# Patient Record
Sex: FEMALE | Race: BLACK OR AFRICAN AMERICAN | NOT HISPANIC OR LATINO | Employment: UNEMPLOYED | URBAN - METROPOLITAN AREA
[De-identification: names, ages, dates, MRNs, and addresses within clinical notes are randomized per-mention and may not be internally consistent; named-entity substitution may affect disease eponyms.]

---

## 2019-09-25 ENCOUNTER — HOSPITAL ENCOUNTER (EMERGENCY)
Facility: OTHER | Age: 29
Discharge: HOME | End: 2019-09-26
Attending: EMERGENCY MEDICINE
Payer: MEDICAID

## 2019-09-25 DIAGNOSIS — T81.89XA DRAINING POSTOPERATIVE WOUND, INITIAL ENCOUNTER: Primary | ICD-10-CM

## 2019-09-25 LAB
ALBUMIN SERPL BCP-MCNC: 3.6 G/DL (ref 3.5–5.2)
ALP SERPL-CCNC: 96 U/L (ref 55–135)
ALT SERPL W/O P-5'-P-CCNC: 49 U/L (ref 10–44)
ANION GAP SERPL CALC-SCNC: 13 MMOL/L (ref 8–16)
AST SERPL-CCNC: 44 U/L (ref 10–40)
B-HCG UR QL: NEGATIVE
BASOPHILS # BLD AUTO: 0.06 K/UL (ref 0–0.2)
BASOPHILS NFR BLD: 0.4 % (ref 0–1.9)
BILIRUB SERPL-MCNC: 0.3 MG/DL (ref 0.1–1)
BUN SERPL-MCNC: 8 MG/DL (ref 6–20)
CALCIUM SERPL-MCNC: 10.4 MG/DL (ref 8.7–10.5)
CHLORIDE SERPL-SCNC: 101 MMOL/L (ref 95–110)
CO2 SERPL-SCNC: 25 MMOL/L (ref 23–29)
CREAT SERPL-MCNC: 0.8 MG/DL (ref 0.5–1.4)
CTP QC/QA: YES
DIFFERENTIAL METHOD: ABNORMAL
EOSINOPHIL # BLD AUTO: 0.1 K/UL (ref 0–0.5)
EOSINOPHIL NFR BLD: 0.7 % (ref 0–8)
ERYTHROCYTE [DISTWIDTH] IN BLOOD BY AUTOMATED COUNT: 13.9 % (ref 11.5–14.5)
EST. GFR  (AFRICAN AMERICAN): >60 ML/MIN/1.73 M^2
EST. GFR  (NON AFRICAN AMERICAN): >60 ML/MIN/1.73 M^2
GLUCOSE SERPL-MCNC: 93 MG/DL (ref 70–110)
HCT VFR BLD AUTO: 31.5 % (ref 37–48.5)
HGB BLD-MCNC: 10.3 G/DL (ref 12–16)
IMM GRANULOCYTES # BLD AUTO: 0.09 K/UL (ref 0–0.04)
IMM GRANULOCYTES NFR BLD AUTO: 0.6 % (ref 0–0.5)
LYMPHOCYTES # BLD AUTO: 5.1 K/UL (ref 1–4.8)
LYMPHOCYTES NFR BLD: 32.2 % (ref 18–48)
MCH RBC QN AUTO: 22.5 PG (ref 27–31)
MCHC RBC AUTO-ENTMCNC: 32.7 G/DL (ref 32–36)
MCV RBC AUTO: 69 FL (ref 82–98)
MONOCYTES # BLD AUTO: 1.2 K/UL (ref 0.3–1)
MONOCYTES NFR BLD: 7.4 % (ref 4–15)
NEUTROPHILS # BLD AUTO: 9.2 K/UL (ref 1.8–7.7)
NEUTROPHILS NFR BLD: 58.7 % (ref 38–73)
NRBC BLD-RTO: 0 /100 WBC
PLATELET # BLD AUTO: 466 K/UL (ref 150–350)
PMV BLD AUTO: 9.1 FL (ref 9.2–12.9)
POTASSIUM SERPL-SCNC: 3.7 MMOL/L (ref 3.5–5.1)
PROT SERPL-MCNC: 9 G/DL (ref 6–8.4)
RBC # BLD AUTO: 4.58 M/UL (ref 4–5.4)
SODIUM SERPL-SCNC: 139 MMOL/L (ref 136–145)
WBC # BLD AUTO: 15.67 K/UL (ref 3.9–12.7)

## 2019-09-25 PROCEDURE — S0077 INJECTION, CLINDAMYCIN PHOSP: HCPCS | Performed by: EMERGENCY MEDICINE

## 2019-09-25 PROCEDURE — 87040 BLOOD CULTURE FOR BACTERIA: CPT | Mod: 59

## 2019-09-25 PROCEDURE — 96361 HYDRATE IV INFUSION ADD-ON: CPT

## 2019-09-25 PROCEDURE — 85025 COMPLETE CBC W/AUTO DIFF WBC: CPT

## 2019-09-25 PROCEDURE — 99284 EMERGENCY DEPT VISIT MOD MDM: CPT | Mod: 25

## 2019-09-25 PROCEDURE — 81025 URINE PREGNANCY TEST: CPT | Performed by: EMERGENCY MEDICINE

## 2019-09-25 PROCEDURE — 96365 THER/PROPH/DIAG IV INF INIT: CPT

## 2019-09-25 PROCEDURE — 96375 TX/PRO/DX INJ NEW DRUG ADDON: CPT

## 2019-09-25 PROCEDURE — 63600175 PHARM REV CODE 636 W HCPCS: Performed by: EMERGENCY MEDICINE

## 2019-09-25 PROCEDURE — 80053 COMPREHEN METABOLIC PANEL: CPT

## 2019-09-25 PROCEDURE — 25000003 PHARM REV CODE 250: Performed by: EMERGENCY MEDICINE

## 2019-09-25 RX ORDER — KETOROLAC TROMETHAMINE 30 MG/ML
15 INJECTION, SOLUTION INTRAMUSCULAR; INTRAVENOUS
Status: COMPLETED | OUTPATIENT
Start: 2019-09-25 | End: 2019-09-25

## 2019-09-25 RX ORDER — CLINDAMYCIN PHOSPHATE 600 MG/50ML
600 INJECTION, SOLUTION INTRAVENOUS
Status: COMPLETED | OUTPATIENT
Start: 2019-09-25 | End: 2019-09-25

## 2019-09-25 RX ADMIN — KETOROLAC TROMETHAMINE 15 MG: 30 INJECTION, SOLUTION INTRAMUSCULAR; INTRAVENOUS at 09:09

## 2019-09-25 RX ADMIN — CLINDAMYCIN IN 5 PERCENT DEXTROSE 600 MG: 12 INJECTION, SOLUTION INTRAVENOUS at 10:09

## 2019-09-25 RX ADMIN — VANCOMYCIN HYDROCHLORIDE 2000 MG: 100 INJECTION, POWDER, LYOPHILIZED, FOR SOLUTION INTRAVENOUS at 10:09

## 2019-09-25 RX ADMIN — SODIUM CHLORIDE 1000 ML: 0.9 INJECTION, SOLUTION INTRAVENOUS at 09:09

## 2019-09-26 VITALS
HEIGHT: 63 IN | SYSTOLIC BLOOD PRESSURE: 123 MMHG | DIASTOLIC BLOOD PRESSURE: 70 MMHG | BODY MASS INDEX: 37.74 KG/M2 | RESPIRATION RATE: 18 BRPM | WEIGHT: 213 LBS | OXYGEN SATURATION: 100 % | TEMPERATURE: 99 F | HEART RATE: 93 BPM

## 2019-09-26 PROCEDURE — 25000003 PHARM REV CODE 250: Performed by: EMERGENCY MEDICINE

## 2019-09-26 RX ORDER — LIDOCAINE HYDROCHLORIDE AND EPINEPHRINE 10; 10 MG/ML; UG/ML
10 INJECTION, SOLUTION INFILTRATION; PERINEURAL
Status: COMPLETED | OUTPATIENT
Start: 2019-09-26 | End: 2019-09-26

## 2019-09-26 RX ORDER — SULFAMETHOXAZOLE AND TRIMETHOPRIM 800; 160 MG/1; MG/1
1 TABLET ORAL 2 TIMES DAILY
Qty: 14 TABLET | Refills: 0 | Status: SHIPPED | OUTPATIENT
Start: 2019-09-26 | End: 2019-10-03

## 2019-09-26 RX ORDER — NAPROXEN 500 MG/1
500 TABLET ORAL 2 TIMES DAILY PRN
Qty: 60 TABLET | Refills: 0 | Status: SHIPPED | OUTPATIENT
Start: 2019-09-26

## 2019-09-26 RX ADMIN — LIDOCAINE HYDROCHLORIDE,EPINEPHRINE BITARTRATE 10 ML: 10; .01 INJECTION, SOLUTION INFILTRATION; PERINEURAL at 12:09

## 2019-09-26 NOTE — DISCHARGE INSTRUCTIONS
Call your primary care doctor to make the first available appointment.     Keep all your medical appointments.     Take your regular medication as prescribed. Contact your primary care provider before running out of medication, or for any problems obtaining them.    Do not drive or operate heavy machinery while taking opioid or muscle relaxing medications. Examples include norco, percocet, xanax, valium, flexeril.     Overuse or long term use of pain and sedating medication may lead to addiction, dependence, organ failure, family and work problems, legal problems, accidental overdose and death.    If you do not have health insurance, you probably qualify for heavily discounted rates:  Call [phone] (Formerly [facility] hotline) or go to www.[employer].[place].gov    Your evaluation in the ED does not suggest any emergent or life threatening medical condition requiring admission or immediate intervention beyond that provided in the ED.   However, the signs of a serious problem sometimes take more time to appear.   RETURN TO THE ER if any of the following occur:    Weakness, dizziness, fainting, or loss of consciousness   Fever of 100.4ºF (38ºC) or higher  Any worse symptoms  Any new or concerning symptoms

## 2019-09-26 NOTE — CONSULTS
[facility]  Plastic Surgery  Consult Note    Patient Name: Soumya Kraft  MRN: [id number]  Code Status: Prior  Admission Date: 2019  Attending Physician: Derik Ring MD  Primary Care Provider: Primary Doctor No    Consults  Subjective:     Chief Complaint/Reason for Admission: Left breast drainage    History of Present Illness:     29F s/p BBR at [facility] with [place] on presents for redness and drainage from her left breast for the past day. For the past several days she has had a GI illness that resolved, asymptomatic today. She has not had a fever since Saturday. Mild tenderness of left breast. Up until this poin her recovery has been uneventful.    Current Facility-Administered Medications on File Prior to Encounter   Medication    etonogestrel Impl 68 mg     Current Outpatient Medications on File Prior to Encounter   Medication Sig    ibuprofen (ADVIL,MOTRIN) 800 MG tablet Take 1 tablet (800 mg total) by mouth 3 (three) times daily.    oxycodone-acetaminophen (PERCOCET) 5-325 mg per tablet Take 1 tablet by mouth every 4 (four) hours as needed.    prenatal vit-iron fumarate-FA 27-1 mg Tab Take 1 tablet by mouth once daily.    valacyclovir (VALTREX) 500 MG tablet Take 500 mg by mouth once daily.       Review of patient's allergies indicates:  No Known Allergies    Past Medical History:   Diagnosis Date    Herpes simplex virus (HSV) infection      labor      Past Surgical History:   Procedure Laterality Date    CERVICAL BIOPSY  W/ LOOP ELECTRODE EXCISION  2016    OLIVIA III     Family History     Problem Relation (Age of Onset)    Hypertension Maternal Aunt    Stroke Maternal Aunt        Tobacco Use    Smoking status: Never Smoker    Smokeless tobacco: Never Used   Substance and Sexual Activity    Alcohol use: No    Drug use: No    Sexual activity: Yes     Partners: Male     Review of Systems  Objective:     Vital Signs (Most Recent):  Temp: 98.5 °F (36.9 °C) (19  1953)  Pulse: 87 (09/25/19 2352)  Resp: 18 (09/25/19 1953)  BP: 137/73 (09/25/19 2346)  SpO2: 100 % (09/25/19 2352) Vital Signs (24h Range):  Temp:  [98.5 °F (36.9 °C)] 98.5 °F (36.9 °C)  Pulse:  [85-92] 87  Resp:  [18] 18  SpO2:  [97 %-100 %] 100 %  BP: (103-137)/(68-83) 137/73     Weight: 96.6 kg (213 lb)  Body mass index is 37.73 kg/m².      Intake/Output Summary (Last 24 hours) at 9/26/2019 0105  Last data filed at 9/25/2019 2307  Gross per 24 hour   Intake 50 ml   Output --   Net 50 ml       Physical Exam    NAD  NWB  R breast healing well, soft, no tenderness  L breast:  Mild erythema with TTP of lower pole, widening of medial lower incision with pinhole opening and seropurulent drainage, no foul odor      Significant Labs:  CBC:   Recent Labs   Lab 09/25/19  2141   WBC 15.67*   RBC 4.58   HGB 10.3*   HCT 31.5*   *   MCV 69*   MCH 22.5*   MCHC 32.7       Significant Diagnostics:  U/S: I have reviewed all pertinent results/findings within the past 24 hours. fluid collection at Left breast    Assessment/Plan:     29F s/p BBR (8/20, [place]) presents with like infected seroma    Performed bedside I&D with cultures and irrigation and gauze packing (tracking along incision lines, small amount of fluid that had been undrained previously)  Please send home with bactrim  Will arrange f/u appointment on Monday at [facility]    Rashaun Valdez MD  Plastic Surgery  [facility]

## 2019-09-26 NOTE — ED PROVIDER NOTES
"Encounter Date: 2019    SCRIBE #1 NOTE: I, Jadyn Tucker, am scribing for, and in the presence of, Dr. Ring.       History     Chief Complaint   Patient presents with    Post-op Problem     breast reduction at [facility]  and now left breast is leaking     Time seen by provider: 9:06 PM    Patient presents with complaint of redness and "skin breakage" around the area of her breast reduction scars for two weeks. The patient also reports drainage from her left breast that began three hours ago. She reports having a breast reduction on 2019.  She had a wound check soon after the surgery, however redness along the left incision lines and "skin breakage" and areolas has persisted since then.  She states that there has not been any drainage lately, until she took off her bra and noted a large amount of drainage this evening.  She has not taken anything for the pain or for these symptoms. She states that she called the clinic multiple times with no response over the past weeks.    She also complains of nausea, vomiting, diarrhea that began four days ago and just alleviated yesterday.  Symptoms were severe, resolved yesterday.  She had subjective fevers.  She denies any suspicious intake, however explains that she was on a cruise for several days.  There was a stop in [place] but this was after her symptoms started.  She denies associated cough, shortness of breath.    The history is provided by the patient, medical records and a relative.     Review of patient's allergies indicates:  No Known Allergies  Past Medical History:   Diagnosis Date    Herpes simplex virus (HSV) infection      labor      Past Surgical History:   Procedure Laterality Date    CERVICAL BIOPSY  W/ LOOP ELECTRODE EXCISION  2016    OLIVIA III     Family History   Problem Relation Age of Onset    Hypertension Maternal Aunt     Stroke Maternal Aunt      Social History     Tobacco Use    Smoking status: Never Smoker    Smokeless " "tobacco: Never Used   Substance Use Topics    Alcohol use: No    Drug use: No     Review of Systems   All other systems reviewed and are negative.  ROS: As per HPI and below:   General: No fever.   HENT: No facial pain.   Eyes: Negative for eye pain.   Cardiovascular: No chest pain.   Respiratory:  No dyspnea.   GI: Nausea. Vomiting. Diarrhea. No abdominal pain.  Skin: Redness (breast). Drainage (left breast). No rashes.   Neuro:  No syncope.  No focal deficits.   Musculoskeletal: No extremity pain.  All other systems reviewed and are negative.      Physical Exam     Initial Vitals [09/25/19 1953]   BP Pulse Resp Temp SpO2   115/73 90 18 98.5 °F (36.9 °C) 100 %      MAP       --         Physical Exam    Nursing note and vitals reviewed.    Nursing note and vitals reviewed.  /70   Pulse 93   Temp 99 °F (37.2 °C) (Oral)   Resp 18   Ht 5' 3" (1.6 m)   Wt 96.6 kg (213 lb)   SpO2 100%   BMI 37.73 kg/m²   Constitutional: AAOx3. No distress. Bra with large amount of purulent material on the inside of the left cup.  Eyes: EOMI. No discharge. Anicteric.  HENT:   Mouth/Throat: Oropharynx is clear. Uvula midline. Mucus membranes moist.  Neck: Normal range of motion. Neck supple.  Cardiovascular: Normal rate. No murmur, no gallop and no friction rub heard.   Pulmonary/Chest: No respiratory distress. Effort normal. No wheezes, no rales, no rhonchi.   Abdominal: Bowel sounds normal. Soft. No distension and no mass. There is no tenderness. There is no rebound, no guarding, no tenderness at McBurney's point.  Musculoskeletal: Normal range of motion.   Neurological: GCS 15. Alert and oriented to person, place, and time. No gross cranial nerve, light touch or strength deficit. Coordination normal.   Skin: Skin is warm and dry.  Breast Exam: Right breast with dry skin on and around the areola with erythema at the vertical incision line. No drainage, tenderness, fluctuance, or edema on the incision line. Left breast with " erythema and edema on the incision line with tiny hole on the medial aspect of the horizontal incision line with drainage, fluctuance, and tenderness.  Psychiatric: Behavior is normal. Judgment normal.      ED Course   Procedures  Labs Reviewed   CBC W/ AUTO DIFFERENTIAL - Abnormal; Notable for the following components:       Result Value    WBC 15.67 (*)     Hemoglobin 10.3 (*)     Hematocrit 31.5 (*)     Mean Corpuscular Volume 69 (*)     Mean Corpuscular Hemoglobin 22.5 (*)     Platelets 466 (*)     MPV 9.1 (*)     Immature Granulocytes 0.6 (*)     Gran # (ANC) 9.2 (*)     Immature Grans (Abs) 0.09 (*)     Lymph # 5.1 (*)     Mono # 1.2 (*)     All other components within normal limits   COMPREHENSIVE METABOLIC PANEL - Abnormal; Notable for the following components:    Total Protein 9.0 (*)     AST 44 (*)     ALT 49 (*)     All other components within normal limits   CULTURE, BLOOD   CULTURE, BLOOD   POCT URINE PREGNANCY          Imaging Results          US Chest Mediastinum (Final result)  Result time 09/25/19 23:54:29    Final result by Jayne Stearns MD (09/25/19 23:54:29)                 Impression:      7.2 x 3.4 x 10.3 cm fluid collection of the left breast subjacent to the region of the scar.    3.4 x 0.6 x 5.4 cm fluid collection of the right lateral breast adjacent to the scar.      Electronically signed by: Jayne Stearns  Date:    09/25/2019  Time:    23:54             Narrative:    EXAMINATION:  US CHEST MEDIASTINUM    CLINICAL HISTORY:  Evaluate for fluid collection, deep space infection;    TECHNIQUE:  Grayscale ultrasound and Doppler are performed.    COMPARISON:  None    FINDINGS:  Left breast shows a 7.2 x 3.4 x 10.3 cm irregular hypoechoic to anechoic area extending from medial to lateral subjacent to the scar.  The appearance is nonspecific and could represent seroma or hematoma.  Infection is difficult to evaluate or exclude by imaging.  There is moderate color Doppler flow probably within  expected limits for postsurgical changes.    The right breast has a 3.4 x 0.6 x 5.4 cm hypoechoic to anechoic irregular area laterally.  This may extend to the skin surface.  This again is nonspecific in appearance but could be seroma or hematoma.  Infection is difficult to evaluate or exclude.  There is moderate adjacent color Doppler flow.                                 Medical Decision Making:   Clinical Tests:   Lab Tests: Ordered and Reviewed  Radiological Study: Ordered and Reviewed            Scribe Attestation:   Scribe #1: I performed the above scribed service and the documentation accurately describes the services I performed. I attest to the accuracy of the note.    Attending Attestation:           Physician Attestation for Scribe:  Physician Attestation Statement for Scribe #1: I, Dr. Ring, reviewed documentation, as scribed by Jadyn Tucker in my presence, and it is both accurate and complete.                 ED Course as of Sep 26 0315   Wed Sep 25, 2019   2127 Patient is a 29-year-old female with history of anxiety, bilateral breast reduction performed at [facility] on 08/22 by Dr. Marvin who presents with purulent discharge from her left breast since this evening.  Over the weekend she had viral syndromes is symptoms, since resolved with fevers, nausea, vomiting, diarrhea.  She states that since discharge she has had a dry abnormal skin appearance at her areolas, was seen for discharge from the incision line several days after surgery.  On exam, patient has a large amount of purulent material the on her bra, , has erythema and edema along the left breast or dental incision line with a tiny hole along the incision line with active drainage, purulence, fluctuance, tenderness.  I am concerned for postoperative wound infection.  Will give the patient antibiotics.      [RC]   2752 I independently reviewed and interpreted labs which are notable for leukocytosis, stable anemia.   Plastic surgery paged.        [RC]   4457 Spoke to on-call plastic surgeon Dr. Chen who will see patient shortly. Says patient can eat until 1 am.    []   Thu Sep 26, 2019   0020 Plastic surgery resident at the bedside    [RC]   0111 Incision and drainage performed at the bedside by plastic surgery. They recommend bactrim as an outpt, will see pt in clinic.   I discussed with patient and/or guardian/caretaker that this evaluation in the ED does not suggest any emergent or life threatening medical condition requiring admission or immediate intervention beyond what was provided in the ED. Regardless, an unremarkable evaluation in the ED does not preclude the development or presence of a serious or life threatening condition. As such, patient was instructed to return immediately for any worsening or change in current symptoms.     I had a detailed discussion with patient  and/or guardian/caretaker regarding findings, plan, return precautions, importance of medication adherence, need to follow-up with a PCP and specialist. All questions answered.     Note was created using voice recognition software. It may have occasional typographical errors not identified and edited despite initial review prior to signing.        [RC]      ED Course User Index  [EH] Roxi Tucker  [RC] Derik Ring MD     Clinical Impression:     1. Draining postoperative wound, initial encounter                                   Derik Ring MD  09/26/19 7489

## 2019-09-26 NOTE — ED NOTES
Pt requesting to speak to MD, wants to leave AMA, states she is getting upset because she wants to eat. MD made aware.

## 2019-10-01 LAB
BACTERIA BLD CULT: NORMAL
BACTERIA BLD CULT: NORMAL

## 2021-04-28 ENCOUNTER — PATIENT MESSAGE (OUTPATIENT)
Dept: RESEARCH | Facility: HOSPITAL | Age: 31
End: 2021-04-28

## 2021-08-12 ENCOUNTER — HOSPITAL ENCOUNTER (EMERGENCY)
Facility: OTHER | Age: 31
Discharge: LEFT BEFORE DISCHARGE | End: 2021-08-12
Attending: EMERGENCY MEDICINE
Payer: MEDICAID

## 2021-08-12 VITALS
DIASTOLIC BLOOD PRESSURE: 90 MMHG | HEART RATE: 116 BPM | SYSTOLIC BLOOD PRESSURE: 118 MMHG | RESPIRATION RATE: 25 BRPM | TEMPERATURE: 98 F | OXYGEN SATURATION: 99 %

## 2021-08-12 DIAGNOSIS — R11.2 NAUSEA VOMITING AND DIARRHEA: Primary | ICD-10-CM

## 2021-08-12 DIAGNOSIS — R19.7 NAUSEA VOMITING AND DIARRHEA: Primary | ICD-10-CM

## 2021-08-12 DIAGNOSIS — F12.90 USES MARIJUANA: ICD-10-CM

## 2021-08-12 DIAGNOSIS — F45.8 PSEUDOCYESIS: ICD-10-CM

## 2021-08-12 DIAGNOSIS — R10.31 RIGHT LOWER QUADRANT ABDOMINAL PAIN: ICD-10-CM

## 2021-08-12 LAB
ALBUMIN SERPL BCP-MCNC: 3.8 G/DL (ref 3.5–5.2)
ALP SERPL-CCNC: 61 U/L (ref 55–135)
ALT SERPL W/O P-5'-P-CCNC: 21 U/L (ref 10–44)
AMPHET+METHAMPHET UR QL: NEGATIVE
ANION GAP SERPL CALC-SCNC: 8 MMOL/L (ref 8–16)
AST SERPL-CCNC: 17 U/L (ref 10–40)
B-HCG UR QL: NEGATIVE
BARBITURATES UR QL SCN>200 NG/ML: NEGATIVE
BASOPHILS # BLD AUTO: 0.05 K/UL (ref 0–0.2)
BASOPHILS NFR BLD: 0.5 % (ref 0–1.9)
BENZODIAZ UR QL SCN>200 NG/ML: NEGATIVE
BILIRUB SERPL-MCNC: 0.4 MG/DL (ref 0.1–1)
BILIRUB UR QL STRIP: NEGATIVE
BUN SERPL-MCNC: 7 MG/DL (ref 6–20)
BZE UR QL SCN: NEGATIVE
CALCIUM SERPL-MCNC: 9.1 MG/DL (ref 8.7–10.5)
CANNABINOIDS UR QL SCN: ABNORMAL
CHLORIDE SERPL-SCNC: 108 MMOL/L (ref 95–110)
CLARITY UR: ABNORMAL
CO2 SERPL-SCNC: 24 MMOL/L (ref 23–29)
COLOR UR: YELLOW
CREAT SERPL-MCNC: 0.7 MG/DL (ref 0.5–1.4)
CREAT UR-MCNC: 343.6 MG/DL (ref 15–325)
CTP QC/QA: YES
DIFFERENTIAL METHOD: ABNORMAL
EOSINOPHIL # BLD AUTO: 0.1 K/UL (ref 0–0.5)
EOSINOPHIL NFR BLD: 0.7 % (ref 0–8)
ERYTHROCYTE [DISTWIDTH] IN BLOOD BY AUTOMATED COUNT: 13.7 % (ref 11.5–14.5)
EST. GFR  (AFRICAN AMERICAN): >60 ML/MIN/1.73 M^2
EST. GFR  (NON AFRICAN AMERICAN): >60 ML/MIN/1.73 M^2
GLUCOSE SERPL-MCNC: 96 MG/DL (ref 70–110)
GLUCOSE UR QL STRIP: NEGATIVE
HCT VFR BLD AUTO: 33.9 % (ref 37–48.5)
HCV AB SERPL QL IA: NEGATIVE
HGB BLD-MCNC: 11.7 G/DL (ref 12–16)
HGB UR QL STRIP: ABNORMAL
HIV 1+2 AB+HIV1 P24 AG SERPL QL IA: NEGATIVE
IMM GRANULOCYTES # BLD AUTO: 0.04 K/UL (ref 0–0.04)
IMM GRANULOCYTES NFR BLD AUTO: 0.4 % (ref 0–0.5)
KETONES UR QL STRIP: NEGATIVE
LEUKOCYTE ESTERASE UR QL STRIP: NEGATIVE
LIPASE SERPL-CCNC: 34 U/L (ref 4–60)
LYMPHOCYTES # BLD AUTO: 4 K/UL (ref 1–4.8)
LYMPHOCYTES NFR BLD: 37.4 % (ref 18–48)
MCH RBC QN AUTO: 25.4 PG (ref 27–31)
MCHC RBC AUTO-ENTMCNC: 34.5 G/DL (ref 32–36)
MCV RBC AUTO: 74 FL (ref 82–98)
METHADONE UR QL SCN>300 NG/ML: NEGATIVE
MONOCYTES # BLD AUTO: 0.5 K/UL (ref 0.3–1)
MONOCYTES NFR BLD: 4.6 % (ref 4–15)
NEUTROPHILS # BLD AUTO: 6.1 K/UL (ref 1.8–7.7)
NEUTROPHILS NFR BLD: 56.4 % (ref 38–73)
NITRITE UR QL STRIP: NEGATIVE
NRBC BLD-RTO: 0 /100 WBC
OPIATES UR QL SCN: NEGATIVE
PCP UR QL SCN>25 NG/ML: NEGATIVE
PH UR STRIP: 6 [PH] (ref 5–8)
PLATELET # BLD AUTO: 415 K/UL (ref 150–450)
PMV BLD AUTO: 8.6 FL (ref 9.2–12.9)
POTASSIUM SERPL-SCNC: 3.6 MMOL/L (ref 3.5–5.1)
PROT SERPL-MCNC: 7.8 G/DL (ref 6–8.4)
PROT UR QL STRIP: NEGATIVE
RBC # BLD AUTO: 4.61 M/UL (ref 4–5.4)
SODIUM SERPL-SCNC: 140 MMOL/L (ref 136–145)
SP GR UR STRIP: >=1.03 (ref 1–1.03)
TOXICOLOGY INFORMATION: ABNORMAL
TSH SERPL DL<=0.005 MIU/L-ACNC: 0.71 UIU/ML (ref 0.4–4)
URN SPEC COLLECT METH UR: ABNORMAL
UROBILINOGEN UR STRIP-ACNC: NEGATIVE EU/DL
WBC # BLD AUTO: 10.76 K/UL (ref 3.9–12.7)

## 2021-08-12 PROCEDURE — 80053 COMPREHEN METABOLIC PANEL: CPT | Performed by: EMERGENCY MEDICINE

## 2021-08-12 PROCEDURE — 63600175 PHARM REV CODE 636 W HCPCS: Performed by: EMERGENCY MEDICINE

## 2021-08-12 PROCEDURE — 85025 COMPLETE CBC W/AUTO DIFF WBC: CPT | Performed by: EMERGENCY MEDICINE

## 2021-08-12 PROCEDURE — 83690 ASSAY OF LIPASE: CPT | Performed by: EMERGENCY MEDICINE

## 2021-08-12 PROCEDURE — 81003 URINALYSIS AUTO W/O SCOPE: CPT | Mod: 59 | Performed by: EMERGENCY MEDICINE

## 2021-08-12 PROCEDURE — 99284 EMERGENCY DEPT VISIT MOD MDM: CPT | Mod: 25

## 2021-08-12 PROCEDURE — 84443 ASSAY THYROID STIM HORMONE: CPT | Performed by: EMERGENCY MEDICINE

## 2021-08-12 PROCEDURE — 87389 HIV-1 AG W/HIV-1&-2 AB AG IA: CPT | Performed by: EMERGENCY MEDICINE

## 2021-08-12 PROCEDURE — 93010 EKG 12-LEAD: ICD-10-PCS | Mod: ,,, | Performed by: INTERNAL MEDICINE

## 2021-08-12 PROCEDURE — 93010 ELECTROCARDIOGRAM REPORT: CPT | Mod: ,,, | Performed by: INTERNAL MEDICINE

## 2021-08-12 PROCEDURE — 81025 URINE PREGNANCY TEST: CPT | Performed by: EMERGENCY MEDICINE

## 2021-08-12 PROCEDURE — 86803 HEPATITIS C AB TEST: CPT | Performed by: EMERGENCY MEDICINE

## 2021-08-12 PROCEDURE — 80307 DRUG TEST PRSMV CHEM ANLYZR: CPT | Performed by: EMERGENCY MEDICINE

## 2021-08-12 PROCEDURE — 93005 ELECTROCARDIOGRAM TRACING: CPT

## 2021-08-12 RX ORDER — KETOROLAC TROMETHAMINE 30 MG/ML
15 INJECTION, SOLUTION INTRAMUSCULAR; INTRAVENOUS
Status: DISCONTINUED | OUTPATIENT
Start: 2021-08-12 | End: 2021-08-12 | Stop reason: HOSPADM

## 2021-08-12 RX ORDER — HYOSCYAMINE SULFATE 0.5 MG/ML
0.5 INJECTION, SOLUTION SUBCUTANEOUS
Status: DISCONTINUED | OUTPATIENT
Start: 2021-08-12 | End: 2021-08-12 | Stop reason: HOSPADM

## 2021-08-12 RX ADMIN — SODIUM CHLORIDE, SODIUM LACTATE, POTASSIUM CHLORIDE, AND CALCIUM CHLORIDE 1000 ML: .6; .31; .03; .02 INJECTION, SOLUTION INTRAVENOUS at 10:08

## 2023-05-30 ENCOUNTER — HOSPITAL ENCOUNTER (EMERGENCY)
Facility: HOSPITAL | Age: 33
Discharge: HOME | End: 2023-05-30
Attending: EMERGENCY MEDICINE
Payer: COMMERCIAL

## 2023-05-30 VITALS
WEIGHT: 200 LBS | DIASTOLIC BLOOD PRESSURE: 78 MMHG | BODY MASS INDEX: 35.44 KG/M2 | OXYGEN SATURATION: 100 % | HEART RATE: 93 BPM | RESPIRATION RATE: 16 BRPM | TEMPERATURE: 99 F | HEIGHT: 63 IN | SYSTOLIC BLOOD PRESSURE: 139 MMHG

## 2023-05-30 DIAGNOSIS — K04.7 DENTAL INFECTION: ICD-10-CM

## 2023-05-30 DIAGNOSIS — E87.6 HYPOKALEMIA: ICD-10-CM

## 2023-05-30 DIAGNOSIS — K08.89 PAIN, DENTAL: Primary | ICD-10-CM

## 2023-05-30 LAB
ALBUMIN SERPL BCP-MCNC: 4 G/DL (ref 3.5–5.2)
ALP SERPL-CCNC: 47 U/L (ref 55–135)
ALT SERPL W/O P-5'-P-CCNC: 13 U/L (ref 10–44)
ANION GAP SERPL CALC-SCNC: 13 MMOL/L (ref 8–16)
AST SERPL-CCNC: 18 U/L (ref 10–40)
B-HCG UR QL: NEGATIVE
BACTERIA #/AREA URNS HPF: NORMAL /HPF
BASOPHILS # BLD AUTO: 0.07 K/UL (ref 0–0.2)
BASOPHILS NFR BLD: 0.5 % (ref 0–1.9)
BILIRUB SERPL-MCNC: 0.3 MG/DL (ref 0.1–1)
BILIRUB UR QL STRIP: NEGATIVE
BUN SERPL-MCNC: 8 MG/DL (ref 6–20)
CALCIUM SERPL-MCNC: 9 MG/DL (ref 8.7–10.5)
CHLORIDE SERPL-SCNC: 109 MMOL/L (ref 95–110)
CLARITY UR: CLEAR
CO2 SERPL-SCNC: 18 MMOL/L (ref 23–29)
COLOR UR: YELLOW
CREAT SERPL-MCNC: 0.9 MG/DL (ref 0.5–1.4)
CTP QC/QA: YES
DIFFERENTIAL METHOD: ABNORMAL
EOSINOPHIL # BLD AUTO: 0 K/UL (ref 0–0.5)
EOSINOPHIL NFR BLD: 0.2 % (ref 0–8)
ERYTHROCYTE [DISTWIDTH] IN BLOOD BY AUTOMATED COUNT: 12.7 % (ref 11.5–14.5)
EST. GFR  (NO RACE VARIABLE): >60 ML/MIN/1.73 M^2
GLUCOSE SERPL-MCNC: 91 MG/DL (ref 70–110)
GLUCOSE UR QL STRIP: NEGATIVE
HCT VFR BLD AUTO: 33.5 % (ref 37–48.5)
HGB BLD-MCNC: 11.8 G/DL (ref 12–16)
HGB UR QL STRIP: ABNORMAL
HYALINE CASTS #/AREA URNS LPF: 0 /LPF
IMM GRANULOCYTES # BLD AUTO: 0.05 K/UL (ref 0–0.04)
IMM GRANULOCYTES NFR BLD AUTO: 0.3 % (ref 0–0.5)
KETONES UR QL STRIP: NEGATIVE
LEUKOCYTE ESTERASE UR QL STRIP: NEGATIVE
LYMPHOCYTES # BLD AUTO: 4.8 K/UL (ref 1–4.8)
LYMPHOCYTES NFR BLD: 31.9 % (ref 18–48)
MCH RBC QN AUTO: 25.8 PG (ref 27–31)
MCHC RBC AUTO-ENTMCNC: 35.2 G/DL (ref 32–36)
MCV RBC AUTO: 73 FL (ref 82–98)
MICROSCOPIC COMMENT: NORMAL
MONOCYTES # BLD AUTO: 0.5 K/UL (ref 0.3–1)
MONOCYTES NFR BLD: 3.4 % (ref 4–15)
NEUTROPHILS # BLD AUTO: 9.6 K/UL (ref 1.8–7.7)
NEUTROPHILS NFR BLD: 63.7 % (ref 38–73)
NITRITE UR QL STRIP: NEGATIVE
NRBC BLD-RTO: 0 /100 WBC
PH UR STRIP: 6 [PH] (ref 5–8)
PLATELET # BLD AUTO: 404 K/UL (ref 150–450)
PMV BLD AUTO: 9.2 FL (ref 9.2–12.9)
POTASSIUM SERPL-SCNC: 3 MMOL/L (ref 3.5–5.1)
PROT SERPL-MCNC: 8.2 G/DL (ref 6–8.4)
PROT UR QL STRIP: ABNORMAL
RBC # BLD AUTO: 4.58 M/UL (ref 4–5.4)
RBC #/AREA URNS HPF: 3 /HPF (ref 0–4)
SODIUM SERPL-SCNC: 140 MMOL/L (ref 136–145)
SP GR UR STRIP: >1.03 (ref 1–1.03)
SQUAMOUS #/AREA URNS HPF: 1 /HPF
URN SPEC COLLECT METH UR: ABNORMAL
UROBILINOGEN UR STRIP-ACNC: NEGATIVE EU/DL
WBC # BLD AUTO: 15.03 K/UL (ref 3.9–12.7)
WBC #/AREA URNS HPF: 3 /HPF (ref 0–5)

## 2023-05-30 PROCEDURE — 25000003 PHARM REV CODE 250: Performed by: PHYSICIAN ASSISTANT

## 2023-05-30 PROCEDURE — 85025 COMPLETE CBC W/AUTO DIFF WBC: CPT | Performed by: PHYSICIAN ASSISTANT

## 2023-05-30 PROCEDURE — 25000003 PHARM REV CODE 250: Performed by: EMERGENCY MEDICINE

## 2023-05-30 PROCEDURE — 99284 EMERGENCY DEPT VISIT MOD MDM: CPT

## 2023-05-30 PROCEDURE — 80053 COMPREHEN METABOLIC PANEL: CPT | Performed by: PHYSICIAN ASSISTANT

## 2023-05-30 PROCEDURE — 81025 URINE PREGNANCY TEST: CPT | Performed by: PHYSICIAN ASSISTANT

## 2023-05-30 PROCEDURE — 81000 URINALYSIS NONAUTO W/SCOPE: CPT | Performed by: PHYSICIAN ASSISTANT

## 2023-05-30 RX ORDER — ONDANSETRON 4 MG/1
4 TABLET, ORALLY DISINTEGRATING ORAL
Status: COMPLETED | OUTPATIENT
Start: 2023-05-30 | End: 2023-05-30

## 2023-05-30 RX ORDER — AMOXICILLIN AND CLAVULANATE POTASSIUM 875; 125 MG/1; MG/1
1 TABLET, FILM COATED ORAL
Status: COMPLETED | OUTPATIENT
Start: 2023-05-30 | End: 2023-05-30

## 2023-05-30 RX ORDER — POTASSIUM CHLORIDE 750 MG/1
10 TABLET, EXTENDED RELEASE ORAL DAILY
Qty: 3 TABLET | Refills: 0 | Status: SHIPPED | OUTPATIENT
Start: 2023-05-30

## 2023-05-30 RX ORDER — AMOXICILLIN AND CLAVULANATE POTASSIUM 875; 125 MG/1; MG/1
1 TABLET, FILM COATED ORAL 2 TIMES DAILY
Qty: 14 TABLET | Refills: 0 | Status: SHIPPED | OUTPATIENT
Start: 2023-05-30

## 2023-05-30 RX ORDER — ONDANSETRON 4 MG/1
4 TABLET, ORALLY DISINTEGRATING ORAL EVERY 6 HOURS PRN
Qty: 20 TABLET | Refills: 0 | Status: SHIPPED | OUTPATIENT
Start: 2023-05-30

## 2023-05-30 RX ORDER — POTASSIUM CHLORIDE 20 MEQ/1
20 TABLET, EXTENDED RELEASE ORAL
Status: COMPLETED | OUTPATIENT
Start: 2023-05-30 | End: 2023-05-30

## 2023-05-30 RX ADMIN — ONDANSETRON 4 MG: 4 TABLET, ORALLY DISINTEGRATING ORAL at 01:05

## 2023-05-30 RX ADMIN — POTASSIUM CHLORIDE 20 MEQ: 1500 TABLET, EXTENDED RELEASE ORAL at 02:05

## 2023-05-30 RX ADMIN — AMOXICILLIN AND CLAVULANATE POTASSIUM 1 TABLET: 875; 125 TABLET, FILM COATED ORAL at 02:05

## 2023-05-30 NOTE — DISCHARGE INSTRUCTIONS

## 2023-05-30 NOTE — ED TRIAGE NOTES
Dental pain over the last 2-3 weeks, worse over last week. States her bad tooth is on right side but that her pain has been on the left and extends up left side of face. Unable to rest at night. Taking exedrin without relief. States she has been trouble finding a dentist that will take her Medicaid. Presents in no distress. Denies fever.

## 2023-05-30 NOTE — ED PROVIDER NOTES
"Encounter Date: 2023       History     Chief Complaint   Patient presents with    Dental Pain     Pain and swelling to the left side of the face and gums. Pain has been present X3 weeks but has become more severe over the last three days.      33-year-old female presents to ED with concern of left-sided lower dental pain that has gradually worsened over past 3 days.  She reports having a known "bad tooth" that causes frequent infections.  Unable to see dentist due to high cost.  Denying fevers, chills but has had intermittent nausea and vomiting that began today.  She has tried OTC medications but with minimal improvement.  No abdominal pain, urinary or bowel complaints, fevers or chills.  No other acute complaints at this time.    The history is provided by the patient.   Review of patient's allergies indicates:  No Known Allergies  Past Medical History:   Diagnosis Date    Herpes simplex virus (HSV) infection      labor      Past Surgical History:   Procedure Laterality Date    CERVICAL BIOPSY  W/ LOOP ELECTRODE EXCISION  2016    OLIVIA III     Family History   Problem Relation Age of Onset    Hypertension Maternal Aunt     Stroke Maternal Aunt      Social History     Tobacco Use    Smoking status: Never    Smokeless tobacco: Never   Substance Use Topics    Alcohol use: No    Drug use: No     Review of Systems   Constitutional:  Negative for chills and fever.   HENT:  Positive for dental problem. Negative for congestion, drooling, ear pain, sore throat and trouble swallowing.    Gastrointestinal:  Positive for nausea and vomiting.   Neurological:  Negative for headaches.     Physical Exam     Initial Vitals [23 1228]   BP Pulse Resp Temp SpO2   139/78 93 16 98.9 °F (37.2 °C) 100 %      MAP       --         Physical Exam    Nursing note and vitals reviewed.  Constitutional: She appears well-developed and well-nourished. She is active. She does not have a sickly appearance. She does not appear " ill. No distress.   HENT:   Head: Normocephalic and atraumatic.   No significant facial swelling  Left lower 2nd molar fractured with surrounding gingival erythema and light swelling.  No visible drainable abscess.  No active drainage.  No trismus.  No drooling.  No stridor.  Oropharynx is clear.  No Ankush's.   Neck: Neck supple.   Normal range of motion.  Musculoskeletal:      Cervical back: Normal range of motion and neck supple.     Neurological: She is alert. GCS eye subscore is 4. GCS verbal subscore is 5. GCS motor subscore is 6.   Skin: Skin is warm and dry.   Psychiatric: She has a normal mood and affect. Her speech is normal and behavior is normal.       ED Course   Procedures  Labs Reviewed   URINALYSIS, REFLEX TO URINE CULTURE - Abnormal; Notable for the following components:       Result Value    Specific Gravity, UA >1.030 (*)     Protein, UA 1+ (*)     Occult Blood UA Trace (*)     All other components within normal limits    Narrative:     Specimen Source->Urine   CBC W/ AUTO DIFFERENTIAL - Abnormal; Notable for the following components:    WBC 15.03 (*)     Hemoglobin 11.8 (*)     Hematocrit 33.5 (*)     MCV 73 (*)     MCH 25.8 (*)     Gran # (ANC) 9.6 (*)     Immature Grans (Abs) 0.05 (*)     Mono % 3.4 (*)     All other components within normal limits   COMPREHENSIVE METABOLIC PANEL - Abnormal; Notable for the following components:    Potassium 3.0 (*)     CO2 18 (*)     Alkaline Phosphatase 47 (*)     All other components within normal limits   URINALYSIS MICROSCOPIC    Narrative:     Specimen Source->Urine   POCT URINE PREGNANCY          Imaging Results    None          Medications   potassium chloride SA CR tablet 20 mEq (has no administration in time range)   amoxicillin-clavulanate 875-125mg per tablet 1 tablet (has no administration in time range)   ondansetron disintegrating tablet 4 mg (4 mg Oral Given 5/30/23 1301)     Medical Decision Making:   Initial Assessment:   Patient presents with  concern of left lower dental pain that has gradually worsened over last 3 days.  Unable to see dentist.  Has had associated nausea and vomiting today.  No fevers, chills, abdominal pain, urinary or bowel complaints.  Afebrile with vitals WNL.  Left lower 2nd molar fractured with gingival swelling.  No visible drainable abscess.  Oropharynx clear with no Ankush's.  Differential Diagnosis:   Dental infection, dental caries, dental abscess, less likely PTA, RPA, Ankush's  ED Management:  CBC, CMP, UA, UPT    UPT negative.  UA with no evidence of urinary infection.  CBC is noting slight elevation WBC 15 K.  CMP noting hypokalemia 3.0.  Will treat with p.o. potassium    Given Zofran in ED.  Will plan to continue with conservative care for suspected dental infection.  Prescription written for naproxen, Zofran and Augmentin.  Encouraged oral hydration, monitor symptoms closely, close dentist follow-up.  ED return precautions were discussed at length.  Patient states her understanding and agrees with plan.                        Clinical Impression:   Final diagnoses:  [K08.89] Pain, dental (Primary)  [K04.7] Dental infection  [E87.6] Hypokalemia        ED Disposition Condition    Discharge Stable          ED Prescriptions       Medication Sig Dispense Start Date End Date Auth. Provider    potassium chloride (KLOR-CON) 10 MEQ TbSR Take 1 tablet (10 mEq total) by mouth once daily. 3 tablet 5/30/2023 -- Tylor Polk PA-C    ondansetron (ZOFRAN-ODT) 4 MG TbDL Take 1 tablet (4 mg total) by mouth every 6 (six) hours as needed (nausea). 20 tablet 5/30/2023 -- Tylor Polk PA-C    amoxicillin-clavulanate 875-125mg (AUGMENTIN) 875-125 mg per tablet Take 1 tablet by mouth 2 (two) times daily. 14 tablet 5/30/2023 -- Tylor Polk PA-C          Follow-up Information       Follow up With Specialties Details Why Contact Info    Your Dentist                 Tylor Polk PA-C  05/30/23 9447

## 2023-07-19 ENCOUNTER — PATIENT MESSAGE (OUTPATIENT)
Dept: RESEARCH | Facility: HOSPITAL | Age: 33
End: 2023-07-19
Payer: COMMERCIAL

## 2024-07-04 ENCOUNTER — OCCUPATIONAL HEALTH (OUTPATIENT)
Dept: URGENT CARE | Facility: CLINIC | Age: 34
End: 2024-07-04

## 2024-07-04 DIAGNOSIS — Z11.1 ENCOUNTER FOR PPD TEST: ICD-10-CM

## 2024-07-04 DIAGNOSIS — Z13.9 ENCOUNTER FOR SCREENING: ICD-10-CM

## 2024-07-04 DIAGNOSIS — Z02.1 PRE-EMPLOYMENT DRUG SCREENING: Primary | ICD-10-CM

## 2024-10-02 ENCOUNTER — HOSPITAL ENCOUNTER (EMERGENCY)
Facility: HOSPITAL | Age: 34
Discharge: HOME | End: 2024-10-02
Attending: EMERGENCY MEDICINE
Payer: MEDICAID

## 2024-10-02 VITALS
BODY MASS INDEX: 35.44 KG/M2 | HEIGHT: 63 IN | DIASTOLIC BLOOD PRESSURE: 80 MMHG | RESPIRATION RATE: 18 BRPM | OXYGEN SATURATION: 99 % | HEART RATE: 88 BPM | WEIGHT: 200 LBS | SYSTOLIC BLOOD PRESSURE: 114 MMHG | TEMPERATURE: 98 F

## 2024-10-02 DIAGNOSIS — K08.89 PAIN, DENTAL: Primary | ICD-10-CM

## 2024-10-02 PROCEDURE — 99284 EMERGENCY DEPT VISIT MOD MDM: CPT | Mod: 25

## 2024-10-02 PROCEDURE — 96372 THER/PROPH/DIAG INJ SC/IM: CPT | Performed by: EMERGENCY MEDICINE

## 2024-10-02 PROCEDURE — 63600175 PHARM REV CODE 636 W HCPCS: Performed by: EMERGENCY MEDICINE

## 2024-10-02 RX ORDER — KETOROLAC TROMETHAMINE 30 MG/ML
15 INJECTION, SOLUTION INTRAMUSCULAR; INTRAVENOUS
Status: COMPLETED | OUTPATIENT
Start: 2024-10-02 | End: 2024-10-02

## 2024-10-02 RX ORDER — IBUPROFEN 800 MG/1
800 TABLET ORAL 3 TIMES DAILY
Qty: 15 TABLET | Refills: 0 | Status: SHIPPED | OUTPATIENT
Start: 2024-10-02 | End: 2024-10-07

## 2024-10-02 RX ORDER — AMOXICILLIN 875 MG/1
875 TABLET, FILM COATED ORAL 2 TIMES DAILY
Qty: 14 TABLET | Refills: 0 | Status: SHIPPED | OUTPATIENT
Start: 2024-10-02

## 2024-10-02 RX ADMIN — KETOROLAC TROMETHAMINE 15 MG: 30 INJECTION, SOLUTION INTRAMUSCULAR; INTRAVENOUS at 10:10

## 2024-10-02 NOTE — Clinical Note
"Soumya Cardenasisrael Kraft was seen and treated in our emergency department on 10/2/2024.  She may return to work on 10/03/2024.       If you have any questions or concerns, please don't hesitate to call.      Prabha' RN RN    "

## 2024-10-02 NOTE — ED PROVIDER NOTES
Encounter Date: 10/2/2024       History     Chief Complaint   Patient presents with    Dental Pain     L side lower tooth, seen at  , told abscess     34-year-old female presents with left-sided dental pain with swelling to the lower jaw for 1 day.  Denies any fevers difficulty swallowing.  Pain is intense.  She states that she has been trying to get dental care for over a year and is on the list at [facility].  She is able to swallow         Review of patient's allergies indicates:  No Known Allergies  Past Medical History:   Diagnosis Date    Herpes simplex virus (HSV) infection      labor      Past Surgical History:   Procedure Laterality Date    CERVICAL BIOPSY  W/ LOOP ELECTRODE EXCISION  2016    OLIVIA III     Family History   Problem Relation Name Age of Onset    Hypertension Maternal Aunt      Stroke Maternal Aunt       Social History     Tobacco Use    Smoking status: Never    Smokeless tobacco: Never   Substance Use Topics    Alcohol use: No    Drug use: No     Review of Systems    Physical Exam     Initial Vitals [10/02/24 1002]   BP Pulse Resp Temp SpO2   114/80 88 18 98.2 °F (36.8 °C) 99 %      MAP       --         Physical Exam    Constitutional:   Patient appears uncomfortable   HENT:   Head: Normocephalic and atraumatic.   Patient able to open her mouth wide.  She has mild swelling to her left lateral gumline around the molar.  No large abscess.  There is no swelling or tenderness to the floor of her mouth.  No signs of Ankush's angina.  She is handling her secretions well.  Her neck is supple   Pulmonary/Chest: No respiratory distress.           ED Course   Procedures  Labs Reviewed   POCT URINE PREGNANCY          Imaging Results    None          Medications   ketorolac injection 15 mg (has no administration in time range)     Medical Decision Making  Patient with small dental abscess.  Will manage this with anti-inflammatories and oral antibiotics.  Will give injection of  Toradol here for pain.  She declined urinary pregnancy test stating that she is not pregnant.  Denies allergies.  I do not believe emergent imaging is indicated based on history and physical exam.  Do not believe emergent consultation or transfer to a hospital that has oral surgery capabilities is indicated based on her history and physical.  She does not have finding of sepsis.  Recommended that she follow up with a dentist and oral surgeon.  Discussed that we do not have the services at this hospital    Amount and/or Complexity of Data Reviewed  Labs: ordered.    Risk  Prescription drug management.                                      Clinical Impression:  Final diagnoses:  [K08.89] Pain, dental (Primary)          ED Disposition Condition    Discharge Stable          ED Prescriptions       Medication Sig Dispense Start Date End Date Auth. Provider    ibuprofen (ADVIL,MOTRIN) 800 MG tablet Take 1 tablet (800 mg total) by mouth 3 (three) times daily. for 5 days 15 tablet 10/2/2024 10/7/2024 Nabeel Carlisle MD    amoxicillin (AMOXIL) 875 MG tablet Take 1 tablet (875 mg total) by mouth 2 (two) times daily. 14 tablet 10/2/2024 -- Nabeel Carlisle MD          Follow-up Information       Follow up With Specialties Details Why Contact Info    Your Primary Care Physician  Schedule an appointment as soon as possible for a visit in 2 days      [facility] - Emergency Dept Emergency Medicine  If symptoms worsen [id number]  [facility] [id number] [phone]             Nabeel Carlisle MD  10/02/24 4920

## 2025-02-21 ENCOUNTER — HOSPITAL ENCOUNTER (EMERGENCY)
Facility: HOSPITAL | Age: 35
Discharge: HOME | End: 2025-02-21
Attending: EMERGENCY MEDICINE
Payer: MEDICAID

## 2025-02-21 VITALS
OXYGEN SATURATION: 100 % | TEMPERATURE: 98 F | WEIGHT: 200 LBS | DIASTOLIC BLOOD PRESSURE: 72 MMHG | BODY MASS INDEX: 35.44 KG/M2 | HEIGHT: 63 IN | RESPIRATION RATE: 18 BRPM | HEART RATE: 86 BPM | SYSTOLIC BLOOD PRESSURE: 117 MMHG

## 2025-02-21 DIAGNOSIS — R05.9 COUGH: Primary | ICD-10-CM

## 2025-02-21 DIAGNOSIS — R07.9 CHEST PAIN: ICD-10-CM

## 2025-02-21 LAB
INFLUENZA A, MOLECULAR: NEGATIVE
INFLUENZA B, MOLECULAR: NEGATIVE
OHS QRS DURATION: 76 MS
OHS QTC CALCULATION: 478 MS
SARS-COV-2 RDRP RESP QL NAA+PROBE: NEGATIVE
SPECIMEN SOURCE: NORMAL

## 2025-02-21 PROCEDURE — 87502 INFLUENZA DNA AMP PROBE: CPT | Performed by: PHYSICIAN ASSISTANT

## 2025-02-21 PROCEDURE — 93010 ELECTROCARDIOGRAM REPORT: CPT | Mod: ,,, | Performed by: INTERNAL MEDICINE

## 2025-02-21 PROCEDURE — 99284 EMERGENCY DEPT VISIT MOD MDM: CPT | Mod: 25

## 2025-02-21 PROCEDURE — 93005 ELECTROCARDIOGRAM TRACING: CPT

## 2025-02-21 PROCEDURE — 25000003 PHARM REV CODE 250: Performed by: PHYSICIAN ASSISTANT

## 2025-02-21 PROCEDURE — 87635 SARS-COV-2 COVID-19 AMP PRB: CPT | Performed by: PHYSICIAN ASSISTANT

## 2025-02-21 RX ORDER — BENZONATATE 100 MG/1
100 CAPSULE ORAL
Status: COMPLETED | OUTPATIENT
Start: 2025-02-21 | End: 2025-02-21

## 2025-02-21 RX ORDER — PROMETHAZINE HYDROCHLORIDE AND DEXTROMETHORPHAN HYDROBROMIDE 6.25; 15 MG/5ML; MG/5ML
5 SYRUP ORAL EVERY 4 HOURS PRN
Qty: 118 ML | Refills: 0 | Status: SHIPPED | OUTPATIENT
Start: 2025-02-21 | End: 2025-03-03

## 2025-02-21 RX ORDER — ACETAMINOPHEN 500 MG
1000 TABLET ORAL
Status: COMPLETED | OUTPATIENT
Start: 2025-02-21 | End: 2025-02-21

## 2025-02-21 RX ADMIN — BENZONATATE 100 MG: 100 CAPSULE ORAL at 12:02

## 2025-02-21 RX ADMIN — ACETAMINOPHEN 1000 MG: 500 TABLET ORAL at 12:02

## 2025-02-21 NOTE — ED PROVIDER NOTES
Encounter Date: 2025       History     Chief Complaint   Patient presents with    Chest Pain    Cough     Pt states productive cough, chest pain, weakness, and heaviness in bilateral arms and hands x1 week     The history is provided by the patient and medical records. No  was used.     Soumya Kraft is a 34 y.o. female presenting to the ED with the chief complaint of chest pain and cough.    Patient reports having a clear-yellow productive cough beginning yesterday that has persisted throughout the day today. Reports unable to sleep due to the cough which prompted her ED visit. She is having chest pain whenever she is coughing. She has associated hoarseness and sinus congestion. Works as a . No fever. She smokes marijuana. Denies other recreational drugs or tobacco use. She additionally reports having numbness in both hands that is most prevalent in her 1,2,3rd fingers BL that she contributes to carpal tunnel. She was told she had CTS in 2016. Reports the numbness has been more prevalent over the last few weeks.     Review of patient's allergies indicates:  No Known Allergies  Past Medical History:   Diagnosis Date    Herpes simplex virus (HSV) infection      labor      Past Surgical History:   Procedure Laterality Date    CERVICAL BIOPSY  W/ LOOP ELECTRODE EXCISION  2016    OLIVIA III     Family History   Problem Relation Name Age of Onset    Hypertension Maternal Aunt      Stroke Maternal Aunt       Social History[1]  Review of Systems   Respiratory:  Positive for cough.    Cardiovascular:  Positive for chest pain.       Physical Exam     Initial Vitals [25 0004]   BP Pulse Resp Temp SpO2   117/72 86 18 97.7 °F (36.5 °C) 100 %      MAP       --         Physical Exam    Constitutional: She appears well-developed and well-nourished. She is not diaphoretic. No distress.   HENT:   Head: Normocephalic and atraumatic. Mouth/Throat: Oropharynx is clear and moist. No  oropharyngeal exudate.   Eyes: Conjunctivae and EOM are normal. Pupils are equal, round, and reactive to light. No scleral icterus.   Neck: Neck supple.   Normal range of motion.  Cardiovascular:  Normal rate and regular rhythm.           Pulmonary/Chest: Breath sounds normal. No respiratory distress. She has no wheezes.   Abdominal: Abdomen is soft. She exhibits no distension. There is no abdominal tenderness. There is no rebound.   Musculoskeletal:         General: No tenderness or edema. Normal range of motion.      Cervical back: Normal range of motion and neck supple.      Comments: +Phalen's test     Neurological: She is alert and oriented to person, place, and time. She has normal strength. No sensory deficit.   Skin: Skin is warm and dry. No rash noted. No erythema.   Psychiatric: She has a normal mood and affect.         ED Course   Procedures  Labs Reviewed   INFLUENZA A & B BY MOLECULAR       Result Value    Influenza A, Molecular Negative      Influenza B, Molecular Negative      Flu A & B Source NP     SARS-COV-2 RNA AMPLIFICATION, QUAL    SARS-CoV-2 RNA, Amplification, Qual Negative     HEPATITIS C ANTIBODY   HIV 1 / 2 ANTIBODY   POCT URINE PREGNANCY     EKG Readings: (Independently Interpreted)   NSR 81 bpm. Normal axis. Slight TWI in V3. Normal axis. No STEMI       Imaging Results    None          Medications   benzonatate capsule 100 mg (100 mg Oral Given 2/21/25 0054)   acetaminophen tablet 1,000 mg (1,000 mg Oral Given 2/21/25 0054)     Medical Decision Making  34 y.o. female presenting to the ED c/o cough and chest pain beginning yesterday.    DDx includes but not limited to viral syndrome, COVID, influenza, pneumonia, reactive airway disease. Lower suspicion for ACS as she does not have significant risk factors, ECG is non-ischemic, and cough only occurs whenever she coughs. PERC negative and do not suspect PE. In regards to her hand numbness, clinical presentation is most consistent with carpal  tunnel syndrome. She has a positive phalen's test on exam. Do not further work-up needed for her hand numbness at this time.    Amount and/or Complexity of Data Reviewed  External Data Reviewed: labs and notes.  Labs: ordered. Decision-making details documented in ED Course.  Radiology: ordered and independent interpretation performed.  ECG/medicine tests: ordered and independent interpretation performed.    Risk  OTC drugs.  Prescription drug management.      Additional MDM:   PERC Rule:   Age is greater than or equal to 50 = 0.0  Heart Rate is greater than or equal to 100 = 0.0  SaO2 on room air < 95% = 0.0  Unilateral leg swelling = 0.0  Hemoptysis = 0.0  Recent surgery or trauma = 0.0  Prior PE or DVT =  0.0  Hormone use = 0.00  PERC Score = 0              ED Course as of 02/21/25 0205 Fri Feb 21, 2025   0204 Influenza A, Molecular: Negative [BA]   0204 Influenza B, Molecular: Negative [BA]   0204 SARS-CoV-2 RNA, Amplification, Qual: Negative [BA]   0205 Patient signed out to my colleague at the end of my shift with instructions to follow-up pending work-up. Patient signed out with CXR pending.      [BA]      ED Course User Index  [BA] Tim Quintero PA-C                           Clinical Impression:  Final diagnoses:  [R07.9] Chest pain  [R05.9] Cough (Primary)                     [1]   Social History  Tobacco Use    Smoking status: Never    Smokeless tobacco: Never   Substance Use Topics    Alcohol use: No    Drug use: No        Tim Quintero PA-C  02/21/25 0205

## 2025-02-21 NOTE — Clinical Note
"Soumya"Cleveland Kraft was seen and treated in our emergency department on 2/20/2025.  She may return to work on 02/23/2025.       If you have any questions or concerns, please don't hesitate to call.      Tim Quintero PA-C"

## 2025-02-21 NOTE — ED TRIAGE NOTES
Soumya Kraft, a 34 y.o. female presents to the ED w/ complaint of productive cough with green sputum with chest discomfort due to the coughing. Pt states she also has been experiencing nasal congestions, generalized body aches and weakness.     Triage note:  Chief Complaint   Patient presents with    Chest Pain    Cough     Pt states productive cough, chest pain, weakness, and heaviness in bilateral arms and hands x1 week     Review of patient's allergies indicates:  No Known Allergies  Past Medical History:   Diagnosis Date    Herpes simplex virus (HSV) infection      labor

## 2025-02-21 NOTE — PROVIDER PROGRESS NOTES - EMERGENCY DEPT.
Encounter Date: 2/20/2025    ED Physician Progress Notes        Physician Note:   AOC @ 2300. Patient presented w/ cough, pain in chest while coughing, productive of yellow mucous. Concern for viral respiratory infection    Pending studies include chest x-ray to rule out pneumonia    Pending actions include discharge with prescription for antitussive    Likely disposition is discharged home    Calin Wright    2:15 AM  Independent review chest x-ray shows no focal consolidation